# Patient Record
Sex: MALE | Race: WHITE | HISPANIC OR LATINO | Employment: OTHER | URBAN - METROPOLITAN AREA
[De-identification: names, ages, dates, MRNs, and addresses within clinical notes are randomized per-mention and may not be internally consistent; named-entity substitution may affect disease eponyms.]

---

## 2024-08-31 PROCEDURE — 99283 EMERGENCY DEPT VISIT LOW MDM: CPT

## 2024-09-01 ENCOUNTER — HOSPITAL ENCOUNTER (EMERGENCY)
Facility: HOSPITAL | Age: 29
Discharge: HOME | End: 2024-09-01
Attending: EMERGENCY MEDICINE | Admitting: EMERGENCY MEDICINE

## 2024-09-01 VITALS
OXYGEN SATURATION: 99 % | SYSTOLIC BLOOD PRESSURE: 140 MMHG | TEMPERATURE: 99.1 F | RESPIRATION RATE: 14 BRPM | HEIGHT: 63 IN | HEART RATE: 83 BPM | DIASTOLIC BLOOD PRESSURE: 93 MMHG | BODY MASS INDEX: 46.88 KG/M2 | WEIGHT: 264.55 LBS

## 2024-09-01 DIAGNOSIS — L60.0 INGROWN RIGHT BIG TOENAIL: Primary | ICD-10-CM

## 2024-09-01 RX ORDER — CEPHALEXIN 500 MG/1
500 CAPSULE ORAL ONCE
Status: COMPLETED | OUTPATIENT
Start: 2024-09-01 | End: 2024-09-01

## 2024-09-01 RX ORDER — HYDROCODONE BITARTRATE AND ACETAMINOPHEN 7.5; 325 MG/1; MG/1
1 TABLET ORAL ONCE
Status: COMPLETED | OUTPATIENT
Start: 2024-09-01 | End: 2024-09-01

## 2024-09-01 RX ORDER — CEPHALEXIN 500 MG/1
500 CAPSULE ORAL 4 TIMES DAILY
Qty: 27 CAPSULE | Refills: 0 | Status: SHIPPED | OUTPATIENT
Start: 2024-09-01 | End: 2024-09-08

## 2024-09-01 RX ADMIN — CEPHALEXIN 500 MG: 500 CAPSULE ORAL at 02:39

## 2024-09-01 RX ADMIN — HYDROCODONE BITARTRATE AND ACETAMINOPHEN 1 TABLET: 7.5; 325 TABLET ORAL at 02:39

## 2024-09-01 NOTE — DISCHARGE INSTRUCTIONS
You have been given emergency department evaluation.  This evaluation is intended to rule out life-threatening conditions.  Is not a complete evaluation.  You could require further testing as determined by your primary care physician or any referred specialist.  Please follow-up with all doctors that you are referred to.  Please be sure to take your prescribed medications and follow any specific instructions in the discharge instructions.  Please follow-up with your primary care physician within 48 hours.  Please have your primary care provider recheck your blood pressure.  Follow-up with podiatry for further evaluation and management of symptoms.  Please return to the emergency department if you experience chest pain, shortness of breath, abdominal pain, fever greater than 102, intractable vomiting.  Please return to the emergency department if your symptoms continue or worsen, or if you begin to experience any other concerning symptom.

## 2024-09-01 NOTE — ED TRIAGE NOTES
Pt ambulatory to triage from home with c/o right great toenail infection - states has pus coming from nail x 3 days.  Pt denies any other complaints at this time.    Triage performed using  [id number]

## 2024-09-01 NOTE — ED PROVIDER NOTES
EMERGENCY DEPARTMENT ENCOUNTER  Room Number:  05/05  PCP: No primary care provider on file.  Independent Historians: Patient with       HPI:  Chief Complaint: had concerns including Toe Pain and Wound Infection.     A complete HPI/ROS/PMH/PSH/SH/FH are unobtainable due to: None    Chronic or social conditions impacting patient care (Social Determinants of Health): None      Context: Jeremi Riojas is a 29 y.o. male who presents to the emergency department with complaints of left great toe pain.  Started earlier today.  He is unaware of any alleviating or aggravating factors.  Patient reports he has seen some clear drainage from the left great toe.  Patient denies fever, chills, headache, lightheadedness, dizziness, numbness, tingling, unilateral extremity weakness, syncope, shortness of breath, chest pain, or other complaints.      Review of prior external notes (non-ED) -and- Review of prior external test results outside of this encounter:   [facility] no [facility] notes and no prior diagnostic studies available for review.       PAST MEDICAL HISTORY  Active Ambulatory Problems     Diagnosis Date Noted    No Active Ambulatory Problems     Resolved Ambulatory Problems     Diagnosis Date Noted    No Resolved Ambulatory Problems     No Additional Past Medical History         PAST SURGICAL HISTORY  No past surgical history on file.      FAMILY HISTORY  No family history on file.      SOCIAL HISTORY         ALLERGIES  Patient has no known allergies.      REVIEW OF SYSTEMS  Included in HPI  All systems reviewed and negative except for those discussed in HPI.      PHYSICAL EXAM    I have reviewed the triage vital signs and nursing notes.    ED Triage Vitals   Temp Heart Rate Resp BP SpO2   09/01/24 0000 09/01/24 0000 09/01/24 0000 09/01/24 0002 09/01/24 0000   99.1 °F (37.3 °C) 83 14 140/93 99 %      Temp src Heart Rate Source Patient  Position BP Location FiO2 (%)   09/01/24 0000 09/01/24 0000 09/01/24 0002 09/01/24 0002 --   Tympanic Monitor Sitting Right arm        GENERAL: not distressed  HENT: nares patent  Head/neck/ face are symmetric without gross deformity or swelling  EYES: no scleral icterus  CV: regular rhythm, regular rate with intact distal pulses  RESPIRATORY: normal effort and no respiratory distress  MUSCULOSKELETAL: no deformity  Left great toe, mild TTP.  Small ingrown toenail on left side of great toe. No drainage, erythema, crepitus, or warmth appreciated.  NEURO: alert and appropriate, moves all extremities, follows commands  SKIN: warm, dry    Vital signs and nursing notes reviewed.      LAB RESULTS  No results found for this or any previous visit (from the past 24 hour(s)).      RADIOLOGY  No Radiology Exams Resulted Within Past 24 Hours      MEDICATIONS GIVEN IN ER  Medications   cephalexin (KEFLEX) capsule 500 mg (500 mg Oral Given 9/1/24 0239)   HYDROcodone-acetaminophen (NORCO) 7.5-325 MG per tablet 1 tablet (1 tablet Oral Given 9/1/24 0239)           OUTPATIENT MEDICATION MANAGEMENT:  No current Epic-ordered facility-administered medications on file.     No current [facility]-ordered outpatient medications on file.         PROGRESS, DATA ANALYSIS, CONSULTS, AND MEDICAL DECISION MAKING  ORDERS PLACED DURING THIS VISIT:  No orders of the defined types were placed in this encounter.      All labs have been independently interpreted by me.  All radiology studies have been reviewed by me. All EKG's have been independently viewed by me.  Discussion below represents my analysis of pertinent findings related to patient's condition, differential diagnosis, treatment plan and final disposition.    Differential diagnosis includes but is not limited to:   Cellulitis, pressure ulcer, ingrown toenail, etc.    ED Course/Progress Notes/MDM:  ED Course as of 09/09/24 2059   Sun Sep 01, 2024   0219 I discussed the case with Dr. Escudero and  he agrees to evaluate the patient at the bedside.    [AR]   0307 The patient was reexamined.  They have had symptomatic improvement during their ED stay.  I discussed today's findings with the patient, explaining the pertinent positives and negatives from today's visit, and the plan of care.  Discussed plan for discharge as there is no emergent indication for admission.  Discussed limitation of the ED work-up and that this is to rule out life-threatening emergencies but that they could require further testing as determined by their primary care and or any referred specialist patient is agreeable and understands need for follow-up and repeat exam/testing.  Patient is aware that discharge does not mean there is nothing wrong, indicates no emergency is present, and that they must continue their care with their primary care physician and/or any referred specialist.  They were given appropriate follow-up with their primary care physician and/or specialist.  I had an extensive discussion on the expected clinical course and return precautions.  Patient understands to return to the emergency department for continuation, worsening, or new symptoms.  I answered any of the patient's questions. Patient was discharged home in a stable condition.     [AR]      ED Course User Index  [AR] Mary Anne Lopez, HOSSEIN           AS OF 02:13 EDT VITALS:    BP - 140/93  HR - 83  TEMP - 99.1 °F (37.3 °C) (Tympanic)  O2 SATS - 99%    MDM:  Left great toe positive for ingrown toenail.  There is no signs of surrounding cellulitis.  Patient started on Keflex.  Advise follow-up with podiatry.  Strict return precautions given.      COMPLEXITY OF CARE  Admission was considered but after careful review of the patient's presentation, physical examination, diagnostic results, and response to treatment the patient may be safely discharged with outpatient follow-up.        DIAGNOSIS  Final diagnoses:   Ingrown right big toenail         DISPOSITION  ED  Disposition       ED Disposition   Discharge    Condition   Stable    Comment   --                    Please note that portions of this document were completed with a voice recognition program.    Note Disclaimer: At [facility], we believe that sharing information builds trust and better relationships. You are receiving this note because you recently visited [facility]. It is possible you will see health information before a provider has talked with you about it. This kind of information can be easy to misunderstand. To help you fully understand what it means for your health, we urge you to discuss this note with your provider.     Mary Anne Lopez, HOSSEIN  09/09/24 1646

## 2024-09-01 NOTE — ED PROVIDER NOTES
MD ATTESTATION NOTE    SHARED VISIT: This visit was performed by BOTH a physician and an APC. The substantive portion of the medical decision making was performed by this attesting physician who made or approved the management plan and takes responsibility for patient management. All studies documented in the APC note (if performed) were independently interpreted by me.    The ANAHI and I have discussed this patient's history, physical exam, MDM, and treatment plan.  I have reviewed the documentation and personally had a face to face interaction with the patient. The attached note describes my personal findings.      Jeremi Riojas is a 29 y.o. male who presents to the ED c/o acute ingrown toenail to left great toe.  Reports some drainage from the lateral edge    On exam:  GENERAL: not distressed  HENT: nares patent  EYES: no scleral icterus  CV: regular rhythm, regular rate  RESPIRATORY: normal effort  ABDOMEN: soft  MUSCULOSKELETAL: no deformity  NEURO: alert, moves all extremities, follows commands  SKIN: warm, dry, mild ingrown toenail to left [place] is a small amount of crusting and appreciate any purulence or significant erythema    Labs  No results found for this or any previous visit (from the past 24 hour(s)).    Radiology  No Radiology Exams Resulted Within Past 24 Hours    Medications given in the ED:  Medications   cephalexin (KEFLEX) capsule 500 mg (500 mg Oral Given 9/1/24 0239)   HYDROcodone-acetaminophen (NORCO) 7.5-325 MG per tablet 1 tablet (1 tablet Oral Given 9/1/24 0239)       Orders placed during this visit:  No orders of the defined types were placed in this encounter.      Medical Decision Making:  ED Course as of 09/01/24 0552   Sun Sep 01, 2024   0219 I discussed the case with Dr. Escudero and he agrees to evaluate the patient at the bedside.    [AR]   0307 The patient was reexamined.  They have had symptomatic improvement during their ED stay.  I discussed today's findings with the  patient, explaining the pertinent positives and negatives from today's visit, and the plan of care.  Discussed plan for discharge as there is no emergent indication for admission.  Discussed limitation of the ED work-up and that this is to rule out life-threatening emergencies but that they could require further testing as determined by their primary care and or any referred specialist patient is agreeable and understands need for follow-up and repeat exam/testing.  Patient is aware that discharge does not mean there is nothing wrong, indicates no emergency is present, and that they must continue their care with their primary care physician and/or any referred specialist.  They were given appropriate follow-up with their primary care physician and/or specialist.  I had an extensive discussion on the expected clinical course and return precautions.  Patient understands to return to the emergency department for continuation, worsening, or new symptoms.  I answered any of the patient's questions. Patient was discharged home in a stable condition.     [AR]      ED Course User Index  [AR] Mary Anne Lopez APRN       Differential diagnosis:  Ingrown toenail, cellulitis, abscess    Diagnosis  Final diagnoses:   Ingrown right big toenail          [place] Escudero MD  09/01/24 0552